# Patient Record
(demographics unavailable — no encounter records)

---

## 2025-02-24 NOTE — PHYSICAL EXAM
[General Appearance - Alert] : alert [Oriented To Time, Place, And Person] : oriented to person, place, and time [Cranial Nerves Optic (II)] : visual acuity intact bilaterally,  pupils equal round and reactive to light [Cranial Nerves Oculomotor (III)] : extraocular motion intact [Cranial Nerves Trigeminal (V)] : facial sensation intact symmetrically [Cranial Nerves Facial (VII)] : face symmetrical [Cranial Nerves Vestibulocochlear (VIII)] : hearing was intact bilaterally [Cranial Nerves Glossopharyngeal (IX)] : tongue and palate midline [Cranial Nerves Accessory (XI - Cranial And Spinal)] : head turning and shoulder shrug symmetric [Cranial Nerves Hypoglossal (XII)] : there was no tongue deviation with protrusion [Motor Tone] : muscle tone was normal in all four extremities [Motor Strength] : muscle strength was normal in all four extremities [Balance] : balance was intact [PERRL With Normal Accommodation] : pupils were equal in size, round, reactive to light, with normal accommodation [Extraocular Movements] : extraocular movements were intact [Full Visual Field] : full visual field [Abnormal Walk] : normal gait

## 2025-02-24 NOTE — PHYSICAL EXAM
[General Appearance - Alert] : alert [Cranial Nerves Optic (II)] : visual acuity intact bilaterally,  pupils equal round and reactive to light [Oriented To Time, Place, And Person] : oriented to person, place, and time [Cranial Nerves Oculomotor (III)] : extraocular motion intact [Cranial Nerves Trigeminal (V)] : facial sensation intact symmetrically [Cranial Nerves Facial (VII)] : face symmetrical [Cranial Nerves Vestibulocochlear (VIII)] : hearing was intact bilaterally [Cranial Nerves Glossopharyngeal (IX)] : tongue and palate midline [Cranial Nerves Accessory (XI - Cranial And Spinal)] : head turning and shoulder shrug symmetric [Cranial Nerves Hypoglossal (XII)] : there was no tongue deviation with protrusion [Motor Tone] : muscle tone was normal in all four extremities [Motor Strength] : muscle strength was normal in all four extremities [Balance] : balance was intact [PERRL With Normal Accommodation] : pupils were equal in size, round, reactive to light, with normal accommodation [Extraocular Movements] : extraocular movements were intact [Full Visual Field] : full visual field [Abnormal Walk] : normal gait

## 2025-02-25 NOTE — DATA REVIEWED
[de-identified] : PROCEDURE: CT ANGIO NECK IC 1/31/25  ORDER #: [id number] CC: ; ; ; End of cc's  Clinical indication: 3 to 4 mm enlargement of the A2 segment left vertebral artery at the level of C2 follow-up     After the intravenous power injection of 90 cc of Omnipaque 350 using a bolus pratima timing run serial thin sections were obtained through the neck from the thoracic inlet through the intracranial circulation centered at the [id number] on a multislice CT scanner reformatted with coronal and sagittal 2 D-MIP projections, including 3 D reconstructions using a separate 3D [employer] software workstation.A total of 90 cc of Omnipaque were intravenously injected. 10 cc were discarded.  Comparison is made with the CTA of 8/23/2024.    The origins of the carotid and vertebral arteries are normal. The left vertebral artery is dominant. The carotid bifurcations are normal.  The distal vertebral arteries are well identified as are the posterior-inferior cerebellar arteries bilaterally. Mild tortuosity versus minimal dilatation of the distal left V2 segment of the vertebral artery at the C2 level appears unchanged compared with the prior examination. No saccular aneurysms are identified. The region of the vertebral basilar junction is normal. The basilar artery is normal. The posterior cerebral and superior cerebellar arteries are normal.  Evaluation of the carotid arteries demonstrate normal appearance to the distal cervical, petrous cavernous and supraclinoid internal carotid arteries. The anterior cerebral arteries ,anterior communicating artery and middle cerebral arteries are normal.  The normal intracranial venous circulation is identified. The right transverse sinus is dominant. The superior sagittal sinus, internal cerebral veins, vein of Lavell, straight sinus, transverse sinuses, sigmoid sinuses and internal jugular veins are normal. Cortical veins are normal.    IMPRESSION: Mild tortuosity versus dilatation of the distal left V2 segment of the vertebral artery at the level of C2 unchanged since 8/23/2024. No saccular aneurysms.

## 2025-02-25 NOTE — DATA REVIEWED
[de-identified] : PROCEDURE: CT ANGIO NECK IC 1/31/25  ORDER #: [id number] CC: ; ; ; End of cc's  Clinical indication: 3 to 4 mm enlargement of the A2 segment left vertebral artery at the level of C2 follow-up     After the intravenous power injection of 90 cc of Omnipaque 350 using a bolus pratima timing run serial thin sections were obtained through the neck from the thoracic inlet through the intracranial circulation centered at the [id number] on a multislice CT scanner reformatted with coronal and sagittal 2 D-MIP projections, including 3 D reconstructions using a separate 3D [employer] software workstation.A total of 90 cc of Omnipaque were intravenously injected. 10 cc were discarded.  Comparison is made with the CTA of 8/23/2024.    The origins of the carotid and vertebral arteries are normal. The left vertebral artery is dominant. The carotid bifurcations are normal.  The distal vertebral arteries are well identified as are the posterior-inferior cerebellar arteries bilaterally. Mild tortuosity versus minimal dilatation of the distal left V2 segment of the vertebral artery at the C2 level appears unchanged compared with the prior examination. No saccular aneurysms are identified. The region of the vertebral basilar junction is normal. The basilar artery is normal. The posterior cerebral and superior cerebellar arteries are normal.  Evaluation of the carotid arteries demonstrate normal appearance to the distal cervical, petrous cavernous and supraclinoid internal carotid arteries. The anterior cerebral arteries ,anterior communicating artery and middle cerebral arteries are normal.  The normal intracranial venous circulation is identified. The right transverse sinus is dominant. The superior sagittal sinus, internal cerebral veins, vein of Lavell, straight sinus, transverse sinuses, sigmoid sinuses and internal jugular veins are normal. Cortical veins are normal.    IMPRESSION: Mild tortuosity versus dilatation of the distal left V2 segment of the vertebral artery at the level of C2 unchanged since 8/23/2024. No saccular aneurysms.

## 2025-02-25 NOTE — HISTORY OF PRESENT ILLNESS
[FreeTextEntry1] : GLORY JIMENEZ is a 72 year female seen today in neurosurgery follow up and for imaging review. Previous notes and interval history reviewed. The patient denies new neurological symptoms. She has seen Dr. Minerva Staples and had a normal EEG and it was felt that her syncopal episode was likely caused by medication interaction, possibly tizanidine and mirtazapine combination.  She has not had any further syncopal episodes since July 2024 and her medications have been adjusted. She also follows with cardiology. She has not been started on aspirin. She has undergone CTA Head and Neck on 1/31/25 which I have independently reviewed and detailed below. negative...

## 2025-02-25 NOTE — ASSESSMENT
[FreeTextEntry1] : GLORY JIMENEZ is a 72 year female who presents in follow up.  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 There is no definitive evidence for vascular dissection, and the affected segment occurs in a region of vascular tortuosity. This is stable when compared directly with prior imaging from 8/23/24.    There remains no indication for neurosurgical or neurovascular intervention. I have recommended MRA brain and neck in one year with an appointment to follow.   I have asked the patient to contact me for any symptomatic development or progression in the interim at which time we can obtain expedited follow up imaging.  A total of 45 minutes was spent relative to this encounter.

## 2025-02-25 NOTE — HISTORY OF PRESENT ILLNESS
[FreeTextEntry1] : GLORY JIMENEZ is a 72 year female seen today in neurosurgery follow up and for imaging review. Previous notes and interval history reviewed. The patient denies new neurological symptoms. She has seen Dr. Minerva Staples and had a normal EEG and it was felt that her syncopal episode was likely caused by medication interaction, possibly tizanidine and mirtazapine combination.  She has not had any further syncopal episodes since July 2024 and her medications have been adjusted. She also follows with cardiology. She has not been started on aspirin. She has undergone CTA Head and Neck on 1/31/25 which I have independently reviewed and detailed below.